# Patient Record
Sex: FEMALE | Race: WHITE
[De-identification: names, ages, dates, MRNs, and addresses within clinical notes are randomized per-mention and may not be internally consistent; named-entity substitution may affect disease eponyms.]

---

## 2019-03-26 ENCOUNTER — RECORD ABSTRACTING (OUTPATIENT)
Age: 69
End: 2019-03-26

## 2019-03-26 PROBLEM — Z00.00 ENCOUNTER FOR PREVENTIVE HEALTH EXAMINATION: Status: ACTIVE | Noted: 2019-03-26

## 2019-03-26 RX ORDER — HYDROCODONE BITARTRATE AND ACETAMINOPHEN 5; 325 MG/1; MG/1
5-325 TABLET ORAL
Refills: 0 | Status: ACTIVE | COMMUNITY

## 2019-03-26 RX ORDER — LATANOPROST/PF 0.005 %
0.01 DROPS OPHTHALMIC (EYE)
Refills: 0 | Status: ACTIVE | COMMUNITY

## 2019-03-26 RX ORDER — BRINZOLAMIDE/BRIMONIDINE TARTRATE 10; 2 MG/ML; MG/ML
1-0.2 SUSPENSION/ DROPS OPHTHALMIC
Refills: 0 | Status: ACTIVE | COMMUNITY

## 2019-03-26 RX ORDER — METHYLPREDNISOLONE 4 MG/1
4 TABLET ORAL
Refills: 0 | Status: ACTIVE | COMMUNITY

## 2019-03-26 RX ORDER — CYCLOBENZAPRINE HYDROCHLORIDE 10 MG/1
10 TABLET, FILM COATED ORAL TWICE DAILY
Refills: 0 | Status: ACTIVE | COMMUNITY

## 2019-03-26 RX ORDER — LOTEPREDNOL ETABONATE 5 MG/ML
0.5 SUSPENSION/ DROPS OPHTHALMIC
Refills: 0 | Status: ACTIVE | COMMUNITY

## 2019-03-26 RX ORDER — FLUTICASONE PROPIONATE 50 UG/1
50 SPRAY, METERED NASAL
Refills: 0 | Status: ACTIVE | COMMUNITY

## 2019-03-28 ENCOUNTER — APPOINTMENT (OUTPATIENT)
Dept: PHYSICAL MEDICINE AND REHAB | Facility: CLINIC | Age: 69
End: 2019-03-28
Payer: MEDICARE

## 2019-03-28 VITALS — HEIGHT: 60 IN | BODY MASS INDEX: 23.56 KG/M2 | WEIGHT: 120 LBS

## 2019-03-28 DIAGNOSIS — M54.17 RADICULOPATHY, LUMBOSACRAL REGION: ICD-10-CM

## 2019-03-28 PROCEDURE — 99213 OFFICE O/P EST LOW 20 MIN: CPT

## 2019-03-28 NOTE — PHYSICAL EXAM
[FreeTextEntry1] : Constitutional\par General Appearance: NAD\par \par Gait and Station\par Appearance: ambulating with no assistive devices\par \par Eyes\par Lids and Conjunctivae: non-injected, no discharge\par Sclerae: non-icteric\par \par Cardiovascular System\par Arterial Pulses: no cyanosis, no jugular venous distention\par Varicosities Right: capillary refill test normal\par Varicosities Left: capillary refill test normal\par \par Chest\par Palpation no tenderness\par Visual Inspection symmetric chest rise\par \par Lungs\par Lungs RR regular, respirations unlabored\par \par Abdomen\par Abdomen not distended\par Palpation non tender\par \par Skin\par Lumbosacral Spine: normal skin\par \par Lumbar Spine\par Inspection: normal alignment\par Bony Palpation of the Lumbar Spine: no tenderness of the spinous process\par Bony Palpation of the Right Hip: no tenderness of the sciatic notch\par Bony Palpation of the Left Hip: no tenderness of the sciatic notch\par Soft Tissue Palpation on the Right: tenderness on palpation of the lumbar paraspinals\par Soft Tissue Palpation on the Left: tenderness on palpation of the lumbar paraspinals\par Active Range of Motion: no pain with motion\par \par Motor Strength\par L1 Motor Strength on the Right: hip flexion iliopsoas 5/5\par L1 Motor Strength on the Left: hip flexion iliopsoas 5/5\par L2-4 Motor Strength on the Right: knee extension quadriceps 5/5\par L2-4 Motor Strength on the Left: knee extension quadriceps 5/5\par L5 Motor Strength on the Right: ankle dorsiflexion tibialis anterior 5/5, great toe extension extensor hallucis longus 5/5\par L5 Motor Strength on the Left: ankle dorsiflexion tibialis anterior 5/5, great toe extension extensor hallucis longus 5/5\par S1 Motor Strength on the Right: plantar flexion gastrocnemius 5/5\par S1 Motor Strength on the Left: plantar flexion gastrocnemius 5/5\par \par Neurological System\par Ankle Reflex Right: normal (2)\par Ankle Reflex Left: normal (2)\par Knee Reflex Right: normal (2)\par Knee Reflex Left: normal (2)\par Sensation on the Right: L1 normal, L2 normal, L3 normal, L4 normal, L5 normal, S1 normal\par Sensation on the Left: L1 normal, L2 normal, L3 normal, L4 normal, L5 normal, S1 normal\par Special Tests on the Right: compression test negative\par Special Tests on the Left: compression test negative\par \par Psychiatric\par Orientation: oriented to time, oriented to place, oriented to person\par Mood and Affect: active and alert

## 2019-03-28 NOTE — HISTORY OF PRESENT ILLNESS
[FreeTextEntry1] : Patient is feeling better. Physical therapy is helping. The pain is less frequent and happens mostly at night. She isn't able to sleep on her right side.

## 2021-05-05 ENCOUNTER — TRANSCRIPTION ENCOUNTER (OUTPATIENT)
Age: 71
End: 2021-05-05